# Patient Record
(demographics unavailable — no encounter records)

---

## 2020-02-10 NOTE — RAD
RIGHT HIP 2 VIEWS:

 

HISTORY: 

Right inguinal pain.

 

FINDINGS: 

Joint space is fairly well preserved.  Some minimal osteophytic lipping of the acetabulum noted.  No 
fracture.

 

IMPRESSION: 

Essentially unremarkable hip.

 

POS: C

## 2020-03-04 NOTE — MRI
MRI PELVIS WITHOUT IV CONTRAST:

3/4/20

 

HISTORY: 

Right inguinal pain with history of right sided pelvic and right groin pain. 

 

FINDINGS: 

Multiplanar and multisequence MRI examination of the pelvis is performed. There is a 1.9 x 4.5 cm janet
meter heterogeneous signal mass in the superficial subcutaneous portion of the anterior abdominal wal
l below the level of the umbilicus which was present and is stable when compared to the 1/31/20 CT st
udy. This may well represent some type of old scar or residual hematoma. There is some edematous atkins
ges involving the right inguinal canal at the level of the abdomen and extending throughout the right
 inguinal canal down to a normal appearing testis which is in the inferior portion of the right ingui
nal canal. There is some associated fluid. On the left side, there is fat containing inguinal hernia 
with some trace fluid in it. The region of the symphysis pubis is unremarkable. No evidence for abnor
mal marrow signal acutely. There is evidence for old left sided pubic fractures. The rectus aponeuros
is and common adductor insertion regions appear unremarkable. No evidence for osteitis pubis. No abno
rmal fluid collection  within the soft tissue pelvis. 

 

IMPRESSION: 

Edematous appearing right inguinal canal possibly inflamed fatty hernia versus inflamed or edematous 
dilated right spermatic cord. A normal appearing right testis extends into the inferior portion of th
e inguinal canal. Fat containing left inguinal hernia without significant inflammation. No evidence f
or osteitis pubis or findings seen with typical sports hernia injury including the common adductor an
d rectus abdominis aponeurosis. Circumscribed focal mass of abnormal signal in the anterior abdominal
 wall in the midline below the level of the umbilicus corresponding to a partially calcified mass in 
this region on prior CT scan of 1/31/20 possibly a residual hematoma. No evidence for avascular necro
sis or acute stress injury or fracture, acute fracture of the pelvis or hips. 

 

POS: [facility]

## 2020-05-10 NOTE — EKG
Test Reason : 

Blood Pressure : ***/*** mmHG

Vent. Rate : 063 BPM     Atrial Rate : 063 BPM

   P-R Int : 176 ms          QRS Dur : 064 ms

    QT Int : 360 ms       P-R-T Axes : 074 068 064 degrees

   QTc Int : 368 ms

 

Normal sinus rhythm with sinus arrhythmia

Normal ECG

When compared with ECG of 07-MAR-2019 11:40,

No significant change was found

Confirmed by ALICIA ALLISON (2) on 5/10/2020 4:04:23 PM

 

Referred By:  URIAH           Confirmed By:ALICIA ALLISON

## 2020-05-11 NOTE — OP
DATE OF PROCEDURE:  05/11/2020



PREOPERATIVE DIAGNOSIS:  Right inguinal hernia.



PROCEDURE PERFORMED:  Right inguinal hernia repair with mesh.



INDICATIONS:  This is a 49-year-old male, morbidly obese, who has been having quite

a bit of groin pain.  I could not feel a hernia due to his body habitus, an MRI

showed it. 



FINDINGS:  Indirect inguinal hernia, right side.



DESCRIPTION OF PROCEDURE:  After informed consent was obtained, the patient was

taken to the operating room, given general mask anesthesia, placed in the supine

position.  Groin was prepped and draped in usual fashion.  Local anesthesia was

infiltrated subcutaneously and deep, and a transverse inguinal incision was

performed through his old scar.  Subcu divided sharply down to the fascia, which was

very deep.  It took a long time to find the spermatic cord, found it.  Cremaster

fibers , hernia sac was found.  This was dissected from surrounding cord

structures down to the internal ring and reduced.  Reduction was maintained with a

PHS hernia system placed in the preperitoneal space.  Then, it was sutured to the

pubic tubercle with a 2-0 Prolene suture, sutured to the internal oblique with a 2-0

Prolene suture.  A notch was cut out for the spermatic cord.  The hemostasis was

achieved.  The fascia was closed with a running 3-0 Vicryl.  Marcus was closed with

interrupted 3-0 Vicryl, and the skin was closed with a running subcuticular 4-0

Rapide.  Steri-Strips applied.  Sterile bandage applied.  The patient tolerated the

procedure well, transferred to Recovery in good condition.  Sponge and needle count

verified correct x2. 







Job ID:  [id number]

## 2020-07-06 NOTE — MRI
MRI LEFT WRIST:

 

Date:  07/06/2020

 

PROVIDED CLINICAL HISTORY:   

Left wrist pain. 

 

FINDINGS:

 

Comparison made with radiograph dated 06/18/2020. 

 

This examination is markedly limited due to susceptibility artifact and distortion related to distal 
radial postoperative change. 

 

Positive ulnar variance is demonstrated as seen on that examination, as is a mildly distracted ulnar 
styloid fracture. There are subcortical cyst-like changes within the ulnar margin of the distalmost i
ntact ulna. No definite corresponding signal alteration is seen within the adjacent triquetrum. The T
FC complex is poorly evaluated on the basis of this examination. No definite evidence for full thickn
ess defect is apparent. There is a small distal radial ulnar joint effusion, however. 

 

The scapholunate and lunotriquetral ligaments are obscured, as is the majority of the radiocarpal leon
nt space. The amount of fluid within the radiocarpal joint appears grossly anatomic. There is greater
 than physiologic mid carpal joint fluid. 

 

There is increased signal intensity on fluid sensitive sequences and mild thickening involving the ex
tensor carpi ulnaris tendon proximal to the joint. There is greater than physiologic ECU tenosynovial
 fluid. 

 

The visualized dorsal extensor and volar flexor tendons demonstrate an otherwise unremarkable MR appe
arance. 

 

The visualized courses of the regional major neurovascular structures appear unremarkable. 

 

 

IMPRESSION: 

 

1.  Limited exam. 

 

2.  Greater than physiologic midcarpal and distal radioulnar joint fluid is present. Assessment of th
e intrinsic wrist ligaments and TFC complex is suboptimal. 

 

3.  ECU tendinosis and mild tenosynovitis. 

 

 

POS: [facility]

## 2020-07-06 NOTE — RAD
Exam: Sinus Hayes view only



HISTORY: MRI clearance



FINDINGS: No radiopaque foreign body projecting over the calvarium, sinuses or orbits. No radiopaque 
foreign bodies in the soft tissues



IMPRESSION: No radiopaque foreign body



Reported By: Mini Bullock 

Electronically Signed:  7/6/2020 9:47 AM

## 2020-11-09 NOTE — RAD
XR Hand Rt 3 View STANDARD



HISTORY: Injury, right hand pain



FINDINGS:

No fracture or dislocation is identified. 



Reported By: Fredis Rogers 

Electronically Signed:  11/9/2020 1:12 PM